# Patient Record
Sex: MALE | Race: BLACK OR AFRICAN AMERICAN | NOT HISPANIC OR LATINO | Employment: UNEMPLOYED | URBAN - METROPOLITAN AREA
[De-identification: names, ages, dates, MRNs, and addresses within clinical notes are randomized per-mention and may not be internally consistent; named-entity substitution may affect disease eponyms.]

---
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
## 2023-10-16 ENCOUNTER — APPOINTMENT (OUTPATIENT)
Dept: RADIOLOGY | Facility: MEDICAL CENTER | Age: 23
End: 2023-10-16
Payer: OTHER GOVERNMENT

## 2023-10-16 ENCOUNTER — OFFICE VISIT (OUTPATIENT)
Dept: OBGYN CLINIC | Facility: MEDICAL CENTER | Age: 23
End: 2023-10-16
Payer: OTHER GOVERNMENT

## 2023-10-16 VITALS — WEIGHT: 120 LBS | SYSTOLIC BLOOD PRESSURE: 125 MMHG | HEART RATE: 53 BPM | DIASTOLIC BLOOD PRESSURE: 73 MMHG

## 2023-10-16 DIAGNOSIS — S52.572A OTHER CLOSED INTRA-ARTICULAR FRACTURE OF DISTAL END OF LEFT RADIUS, INITIAL ENCOUNTER: Primary | ICD-10-CM

## 2023-10-16 DIAGNOSIS — M25.532 LEFT WRIST PAIN: ICD-10-CM

## 2023-10-16 PROCEDURE — 73110 X-RAY EXAM OF WRIST: CPT

## 2023-10-16 PROCEDURE — 25600 CLTX DST RDL FX/EPHYS SEP WO: CPT | Performed by: ORTHOPAEDIC SURGERY

## 2023-10-16 PROCEDURE — 99203 OFFICE O/P NEW LOW 30 MIN: CPT | Performed by: ORTHOPAEDIC SURGERY

## 2023-10-16 NOTE — PROGRESS NOTES
The HAND & UPPER EXTREMITY OFFICE VISIT   Referred By:  Referral Self  No address on file      Chief Complaint:     Left hand pain, DOI 10/6/23    History of Present Illness:   24 y.o., right hand dominant male presents for initial evaluation of left hand pain following a basketball injury on 10/6/2023. Patient was transported to from [facility] prison and is accompanied by 2 officers. Patient reports he was playing basketball when his legs gave out and he fell onto an outstretched left hand. Patient has been a wrist wrap as well as an Ace bandage. He was given Advil as needed for pain. Patient notes pain at the base of the left thumb as well as swelling of the left hand. ADLs: Community ambulator      Past Medical History:  No past medical history on file. No past surgical history on file. No family history on file. Social History     Socioeconomic History    Marital status: Single     Spouse name: Not on file    Number of children: Not on file    Years of education: Not on file    Highest education level: Not on file   Occupational History    Not on file   Tobacco Use    Smoking status: Never    Smokeless tobacco: Never   Substance and Sexual Activity    Alcohol use: No    Drug use: No    Sexual activity: Not on file   Other Topics Concern    Not on file   Social History Narrative    Not on file     Social Determinants of Health     Financial Resource Strain: Not on file   Food Insecurity: Not on file   Transportation Needs: Not on file   Physical Activity: Not on file   Stress: Not on file   Social Connections: Not on file   Intimate Partner Violence: Not on file   Housing Stability: Not on file     Scheduled Meds:  Continuous Infusions:No current facility-administered medications for this visit. PRN Meds:.   No Known Allergies        Physical Examination:    /73   Pulse (!) 53   Wt 54.4 kg (120 lb)     Gen: A&Ox3, NAD  Cardiac: regular rate  Chest: non labored breathing  Abdomen: Non-distended      Right Upper Extremity:  Skin CDI  No obvious deformity of the shoulder, arm, elbow, forearm, wrist, hand  Non tender  Able to make a composite fist  Sensation intact to light touch in the axillary median, ulnar, and radial nerve distributions  5/5 motor   2+RP      Left Upper Extremity:  Skin CDI  No obvious deformity of the shoulder, arm, elbow, forearm, wrist, hand  Tender over the distal radius metaphysis  Unable to make a composite fist  Sensation intact to light touch in the axillary median, ulnar, and radial nerve distributions  Strength testing deferred   2+RP      Studies:  Radiographs: I personally reviewed and independently interpreted the available radiographs. 10/16/23: Radiographs of the left wrist, multiple views, demonstrate a minimally displaced intra-articular distal radius fracture with less than 1 mm of joint step-off. No other fractures noted. No dislocations. Soft tissues unremarkable. Assessment and Plan:  1. Other closed intra-articular fracture of distal end of left radius, initial encounter        2. Left wrist pain  XR wrist 3+ vw left          24 y.o. male presents with signs and symptoms consistent with the above diagnosis. We discussed the natural history of this condition and its pathogenesis. We discussed operative and nonoperative treatment options. Given the good alignment of the fracture and the fact that its been stable and a soft wrap for the past week and a half I think that he will do well with nonoperative management. Patient agrees with this plan. He was placed into a short arm cast today for close management of his left distal radius fracture. Please see procedure note for further details. He should remain nonweightbearing. He should move his fingers in order to regain a composite fist.  He should keep his cast clean and dry.   He will follow-up in 4 weeks for repeat evaluation and x-rays out of the cast.    he expressed understanding of the plan and agreed. We encouraged them to contact our office with any questions or concerns.          Mary Lou Jeffery MD  Hand and Upper Extremity Surgery

## 2023-10-16 NOTE — PROGRESS NOTES
Fracture / Dislocation Treatment    Date/Time: 10/16/2023 11:30 AM    Performed by: Isamar Zaidi MD  Authorized by: Isamar Zaidi MD    Patient Location:  [facility] Protocol:  Consent: Verbal consent obtained.   Risks and benefits: risks, benefits and alternatives were discussed  Consent given by: patient  Patient identity confirmed: verbally with patient    Injury location:  Wrist  Location details:  Left wrist  Injury type:  Fracture  Fracture type: distal radius    Fracture type: distal radius    Neurovascular status: Neurovascularly intact    Local anesthesia used?: No    Manipulation performed?: No    Immobilization:  Cast  Cast type:  Short arm  Supplies used:  Cotton padding and fiberglass  Neurovascular status: Neurovascularly intact    Patient tolerance:  Patient tolerated the procedure well with no immediate complications

## 2023-11-13 ENCOUNTER — APPOINTMENT (OUTPATIENT)
Dept: RADIOLOGY | Facility: MEDICAL CENTER | Age: 23
End: 2023-11-13
Payer: OTHER GOVERNMENT

## 2023-11-13 ENCOUNTER — OFFICE VISIT (OUTPATIENT)
Dept: OBGYN CLINIC | Facility: MEDICAL CENTER | Age: 23
End: 2023-11-13

## 2023-11-13 VITALS — HEART RATE: 51 BPM | WEIGHT: 150.6 LBS | SYSTOLIC BLOOD PRESSURE: 108 MMHG | DIASTOLIC BLOOD PRESSURE: 73 MMHG

## 2023-11-13 DIAGNOSIS — S52.572D OTHER CLOSED INTRA-ARTICULAR FRACTURE OF DISTAL END OF LEFT RADIUS WITH ROUTINE HEALING, SUBSEQUENT ENCOUNTER: Primary | ICD-10-CM

## 2023-11-13 PROCEDURE — 99024 POSTOP FOLLOW-UP VISIT: CPT | Performed by: ORTHOPAEDIC SURGERY

## 2023-11-13 PROCEDURE — 73110 X-RAY EXAM OF WRIST: CPT

## 2023-11-13 NOTE — PROGRESS NOTES
HAND & UPPER EXTREMITY OFFICE VISIT   Referred By:  No referring provider defined for this encounter. Chief Complaint:     Left wrist pain  Distal radius fx, 10/6/2023    Previous History:   50-year-old right-hand-dominant male with a fall on 10/6/2023 resulting in left distal radius fracture. At his office visit on 10/16/2023 he was placed into a short arm cast.    Interval History:  50-year-old male returns today with [facility] senior care escorts for repeat evaluation of his left distal radius fracture. At his visit last month he was placed into a short arm cast. Denies any new complaints since the cast was applied. Past Medical History:  History reviewed. No pertinent past medical history. History reviewed. No pertinent surgical history. History reviewed. No pertinent family history. Social History     Socioeconomic History    Marital status: Single     Spouse name: Not on file    Number of children: Not on file    Years of education: Not on file    Highest education level: Not on file   Occupational History    Not on file   Tobacco Use    Smoking status: Never    Smokeless tobacco: Never   Substance and Sexual Activity    Alcohol use: No    Drug use: No    Sexual activity: Not on file   Other Topics Concern    Not on file   Social History Narrative    Not on file     Social Determinants of Health     Financial Resource Strain: Not on file   Food Insecurity: Not on file   Transportation Needs: Not on file   Physical Activity: Not on file   Stress: Not on file   Social Connections: Not on file   Intimate Partner Violence: Not on file   Housing Stability: Not on file     Scheduled Meds:  Continuous Infusions:No current facility-administered medications for this visit. PRN Meds:.   No Known Allergies    Physical Examination:    /73   Pulse (!) 51   Wt 68.3 kg (150 lb 9.6 oz)     Gen: A&Ox3, NAD  Cardiac: regular rate  Chest: non labored breathing  Abdomen: Non-distended      Left Upper Extremity:  Short arm cast removed  Skin CDI  No obvious deformity of the shoulder, arm, elbow, forearm  Minimal swelling  Mild tenderness over distal radius fracture, non-tender at the wrist joint itself  30 degrees of extension  20 degrees of flexion  Loose composite fist  Sensation intact to light touch in the axillary median, ulnar, and radial nerve distributions  Strength not tested  2+RP    Studies:  Radiographs: I personally reviewed and independently interpreted the available radiographs. 11/13/2023: Radiographs of the left wrist, multiple views, demonstrate: .interval healing minimally displaced intraarticular distal radius  fracture, fracture lines still visible, no further change of displacement       Assessment and Plan:  1. Other closed intra-articular fracture of distal end of left radius with routine healing, subsequent encounter  XR wrist 3+ vw left          24 y.o. male presents in follow up of his left distal radius fracture, 10/6/2023. Cast removed today, x-rays taken and reviewed, physical exam performed. Healing distal radius fracture. Recommend some wrist wrap for support for 2 more weeks. Avoid heavy lifting for a few more weeks. Avoid heavy loading with left wrist.     he expressed understanding of the plan and agreed. We encouraged them to contact our office with any questions or concerns. Return for re-check left wrist ROM in 4-6 weeks, no XRs needed. Radha Pemberton MD  Hand and Upper Extremity Surgery    Scribe Attestation      I,:  Tu Alcaraz am acting as a scribe while in the presence of the attending physician.:       I,:  Darion Vick MD personally performed the services described in this documentation    as scribed in my presence.:               *This note was dictated using Dragon voice recognition software. Please excuse any word substitutions or errors. *

## 2024-01-03 ENCOUNTER — OFFICE VISIT (OUTPATIENT)
Dept: OBGYN CLINIC | Facility: MEDICAL CENTER | Age: 24
End: 2024-01-03

## 2024-01-03 VITALS — DIASTOLIC BLOOD PRESSURE: 72 MMHG | HEART RATE: 55 BPM | SYSTOLIC BLOOD PRESSURE: 103 MMHG

## 2024-01-03 DIAGNOSIS — S52.572D OTHER CLOSED INTRA-ARTICULAR FRACTURE OF DISTAL END OF LEFT RADIUS WITH ROUTINE HEALING, SUBSEQUENT ENCOUNTER: Primary | ICD-10-CM

## 2024-01-03 PROCEDURE — 99024 POSTOP FOLLOW-UP VISIT: CPT | Performed by: ORTHOPAEDIC SURGERY

## 2024-01-03 NOTE — PROGRESS NOTES
HAND & UPPER EXTREMITY OFFICE VISIT   Referred By:  No referring provider defined for this encounter.      Chief Complaint:     Left wrist pain  Distal radius fx, 10/6/2023    Previous History:   23-year-old male returns today with [facility] escorts for repeat evaluation of his left distal radius fracture.  At his last visit his cast was removed and he was provided wrist wraps for support. He had no new complaints      Interval History:  23-year-old male returns today with [facility] escorts for repeat evaluation of his left distal radius fracture.  He is now 3 months out from injury.  Since last visit he put a new splint back on which was the original 1 that given even though he was recommended discontinue his cast last visit.  He felt he needed some more support.  He has not been working on wrist range of motion.  He has been working on finger motion.  Denies numbness or tingling.  Pain has been improving.    Past Medical History:  History reviewed. No pertinent past medical history.  History reviewed. No pertinent surgical history.  History reviewed. No pertinent family history.  Social History     Socioeconomic History    Marital status: Single     Spouse name: Not on file    Number of children: Not on file    Years of education: Not on file    Highest education level: Not on file   Occupational History    Not on file   Tobacco Use    Smoking status: Never    Smokeless tobacco: Never   Substance and Sexual Activity    Alcohol use: No    Drug use: No    Sexual activity: Not on file   Other Topics Concern    Not on file   Social History Narrative    Not on file     Social Determinants of Health     Financial Resource Strain: Not on file   Food Insecurity: Not on file   Transportation Needs: Not on file   Physical Activity: Not on file   Stress: Not on file   Social Connections: Not on file   Intimate Partner Violence: Not on file   Housing Stability: Not on file     Scheduled  Meds:  Continuous Infusions:No current facility-administered medications for this visit.    PRN Meds:.  No Known Allergies    Physical Examination:    /72   Pulse 55     Gen: A&Ox3, NAD  Cardiac: regular rate  Chest: non labored breathing  Abdomen: Non-distended          Left Upper Extremity:  Skin CDI  No obvious deformity of the shoulder, arm, elbow, forearm, wrist, hand  No significant swelling.  Nontender over the distal radius but he is mildly tender over the dorsal radiocarpal  Sensation intact to light touch in the axillary median, ulnar, and radial nerve distributions  Significant limited wrist range of motion by 10 degrees extension to 1 degrees flexion.  Decreased supination to 45 degrees and pronation to 60 degrees.    4/5  strength    2+RP    Studies:  No new imaging obtained today  Assessment and Plan:  1. Other closed intra-articular fracture of distal end of left radius with routine healing, subsequent encounter            23 y.o. male presents in follow up for the above diagnosis.  He is 3 months out from his nondisplaced distal radius fracture.  Removed his splint that he had on today and discontinued it.  He should work on range of motion of the wrist is much as possible to improve upon his stiffness.  Think the residual pain he is having is actually due to stiffness of the joint as opposed to the pain at the fracture.  His fracture is nontender today and he has clinically healed.  He is radiographically healed at last visit as well.    Recommended that he can use ibuprofen 3 times a day as needed for relief of his pain and swelling.  Follow-up in 6 to 8 weeks for range of motion check.  Proper paperwork filled out.    he expressed understanding of the plan and agreed. We encouraged them to contact our office with any questions or concerns.       Del Gonzales MD  Hand and Upper Extremity Surgery      *This note was dictated using Dragon voice recognition software. Please excuse any  word substitutions or errors.*

## 2024-02-02 ENCOUNTER — OFFICE VISIT (OUTPATIENT)
Dept: OBGYN CLINIC | Facility: CLINIC | Age: 24
End: 2024-02-02
Payer: OTHER GOVERNMENT

## 2024-02-02 VITALS — BODY MASS INDEX: 24.11 KG/M2 | WEIGHT: 150 LBS | HEIGHT: 66 IN

## 2024-02-02 DIAGNOSIS — S52.572D OTHER CLOSED INTRA-ARTICULAR FRACTURE OF DISTAL END OF LEFT RADIUS WITH ROUTINE HEALING, SUBSEQUENT ENCOUNTER: Primary | ICD-10-CM

## 2024-02-02 PROCEDURE — 99213 OFFICE O/P EST LOW 20 MIN: CPT | Performed by: ORTHOPAEDIC SURGERY

## 2024-02-02 NOTE — PROGRESS NOTES
"    HAND & UPPER EXTREMITY OFFICE VISIT   Referred By:  No referring provider defined for this encounter.      Chief Complaint:     Left wrist pain  Distal radius fx, 10/6/2023    Previous History:   Patient was last seen 1/3/2024 and was advised to work on ROM of his wrist as he is stiff.     Interval History:  Patient states today that he continues to have stiffness as well as pain with AROM of his wrist. He also reports occasional swelling of his hand as well    Past Medical History:  Past Medical History:   Diagnosis Date    Head injury      History reviewed. No pertinent surgical history.  Family History   Problem Relation Age of Onset    Vascular Disease Mother     Diabetes Mother     Diabetes Paternal Grandfather      Social History     Socioeconomic History    Marital status: Single     Spouse name: Not on file    Number of children: Not on file    Years of education: Not on file    Highest education level: Not on file   Occupational History    Not on file   Tobacco Use    Smoking status: Never    Smokeless tobacco: Never   Vaping Use    Vaping status: Never Used   Substance and Sexual Activity    Alcohol use: No    Drug use: No    Sexual activity: Not on file   Other Topics Concern    Not on file   Social History Narrative    Not on file     Social Determinants of Health     Financial Resource Strain: Not on file   Food Insecurity: Not on file   Transportation Needs: Not on file   Physical Activity: Not on file   Stress: Not on file   Social Connections: Not on file   Intimate Partner Violence: Not on file   Housing Stability: Not on file     Scheduled Meds:  Continuous Infusions:No current facility-administered medications for this visit.    PRN Meds:.  No Known Allergies    Physical Examination:    Ht 5' 6\" (1.676 m)   Wt 68 kg (150 lb)   BMI 24.21 kg/m²     Gen: A&Ox3, NAD  Cardiac: regular rate  Chest: non labored breathing  Abdomen: Non-distended        Left Upper Extremity:  Skin CDI  No obvious " deformity of the shoulder, arm, elbow, forearm, wrist, hand  Sensation intact to light touch in the axillary median, ulnar, and radial nerve distributions  2+RP  Composite fist  Limited flexion of wrist 2nd to sriffness    Studies:  No new images obtained      Assessment and Plan:  1. Other closed intra-articular fracture of distal end of left radius with routine healing, subsequent encounter            23 y.o. male presents in follow up for the above diagnosis.     He was advised to keep stretching his wrist and fingers. Motion does take time to improve as he remains very stiff. He may be more aggressive with his motion.   If he does not show improvement  we can consider CSI to calm down his inflammation. He may continue to take NSAID's as needed for pain    F/U 2 months for ROM check    he expressed understanding of the plan and agreed. We encouraged them to contact our office with any questions or concerns.       Del Gonzales MD  Hand and Upper Extremity Surgery      *This note was dictated using Dragon voice recognition software. Please excuse any word substitutions or errors.*

## 2024-04-05 ENCOUNTER — OFFICE VISIT (OUTPATIENT)
Dept: OBGYN CLINIC | Facility: CLINIC | Age: 24
End: 2024-04-05
Payer: OTHER GOVERNMENT

## 2024-04-05 VITALS — SYSTOLIC BLOOD PRESSURE: 126 MMHG | DIASTOLIC BLOOD PRESSURE: 73 MMHG

## 2024-04-05 DIAGNOSIS — S52.572D OTHER CLOSED INTRA-ARTICULAR FRACTURE OF DISTAL END OF LEFT RADIUS WITH ROUTINE HEALING, SUBSEQUENT ENCOUNTER: ICD-10-CM

## 2024-04-05 DIAGNOSIS — M65.4 DE QUERVAIN'S DISEASE (RADIAL STYLOID TENOSYNOVITIS): Primary | ICD-10-CM

## 2024-04-05 PROCEDURE — 20550 NJX 1 TENDON SHEATH/LIGAMENT: CPT | Performed by: ORTHOPAEDIC SURGERY

## 2024-04-05 PROCEDURE — 99213 OFFICE O/P EST LOW 20 MIN: CPT | Performed by: ORTHOPAEDIC SURGERY

## 2024-04-05 RX ORDER — BETAMETHASONE SODIUM PHOSPHATE AND BETAMETHASONE ACETATE 3; 3 MG/ML; MG/ML
6 INJECTION, SUSPENSION INTRA-ARTICULAR; INTRALESIONAL; INTRAMUSCULAR; SOFT TISSUE
Status: COMPLETED | OUTPATIENT
Start: 2024-04-05 | End: 2024-04-05

## 2024-04-05 RX ORDER — LIDOCAINE HYDROCHLORIDE 10 MG/ML
1 INJECTION, SOLUTION INFILTRATION; PERINEURAL
Status: COMPLETED | OUTPATIENT
Start: 2024-04-05 | End: 2024-04-05

## 2024-04-05 RX ADMIN — LIDOCAINE HYDROCHLORIDE 1 ML: 10 INJECTION, SOLUTION INFILTRATION; PERINEURAL at 14:15

## 2024-04-05 RX ADMIN — BETAMETHASONE SODIUM PHOSPHATE AND BETAMETHASONE ACETATE 6 MG: 3; 3 INJECTION, SUSPENSION INTRA-ARTICULAR; INTRALESIONAL; INTRAMUSCULAR; SOFT TISSUE at 14:15

## 2024-04-05 NOTE — PROGRESS NOTES
HAND & UPPER EXTREMITY OFFICE VISIT   Referred By:  No referring provider defined for this encounter.      Chief Complaint:     Left wrist pain    Previous History:   Previously managed nonoperatively with a minimally displaced distal radius fracture in a cast.  He had been doing well and working on wrist range of motion.  He has been weightbearing as tolerated    Interval History:  He states that overall he is doing well but he is having some pain on the radial aspect of his wrist when he moves his thumb around.  He states that this flares up with repetitive activities.  Denies numbness or tingling.  States that this feels different than when he broke his wrist.    Past Medical History:  Past Medical History:   Diagnosis Date    Head injury      No past surgical history on file.  Family History   Problem Relation Age of Onset    Vascular Disease Mother     Diabetes Mother     Diabetes Paternal Grandfather      Social History     Socioeconomic History    Marital status: Single     Spouse name: Not on file    Number of children: Not on file    Years of education: Not on file    Highest education level: Not on file   Occupational History    Not on file   Tobacco Use    Smoking status: Never    Smokeless tobacco: Never   Vaping Use    Vaping status: Never Used   Substance and Sexual Activity    Alcohol use: No    Drug use: No    Sexual activity: Not on file   Other Topics Concern    Not on file   Social History Narrative    Not on file     Social Determinants of Health     Financial Resource Strain: Not on file   Food Insecurity: Not on file   Transportation Needs: Not on file   Physical Activity: Not on file   Stress: Not on file   Social Connections: Not on file   Intimate Partner Violence: Not on file   Housing Stability: Not on file     Scheduled Meds:  Continuous Infusions:No current facility-administered medications for this visit.    PRN Meds:.  No Known Allergies    Physical Examination:    /73      Gen: A&Ox3, NAD  Cardiac: regular rate  Chest: non labored breathing  Abdomen: Non-distended        Left Upper Extremity:  Skin CDI  No obvious deformity of the shoulder, arm, elbow, forearm, wrist, hand  Tender over the first dorsal compartment  Positive Finkelstein's  Nontender over the thumb CMC joint, nontender over the distal radius  Sensation intact to light touch in the axillary median, ulnar, and radial nerve distributions  5/5 motor  strength  2+RP    Studies:  No new imaging    Assessment and Plan:  1. De Quervain's disease (radial styloid tenosynovitis)        2. Other closed intra-articular fracture of distal end of left radius with routine healing, subsequent encounter            23 y.o. male presents in follow up for the above diagnosis.  This radius fracture is healing well but he appears to have developed de Quervain's tendinitis.  Discussed treatment options including continued exercises bracing or steroid injections.  Bracing is difficult for him in the present as it does change where he has to live.  He would prefer to avoid bracing.  He would like to try de Quervain's injection today. Risks, benefits and alternative treatments were discussed with the patient. The risks of injection include but are not limited to: bleeding, infection, damage to nerves, vessels or tendons, allergic reaction to agents, possible increase in pain, tendon or ligament rupture, weakening of bone or soft tissues, and/or elevation in blood sugar. Patient understands and would like to proceed with the proposed procedure. Injection was tolerated well. Please see procedure note for details. May take NSAIDs/Tylenol or apply ice to the area as needed for post-injection discomfort.       He will follow-up in 6 to 8 weeks for repeat evaluation.        he expressed understanding of the plan and agreed. We encouraged them to contact our office with any questions or concerns.       Del Gonzales MD  Hand and Upper  Extremity Surgery      *This note was dictated using Dragon voice recognition software. Please excuse any word substitutions or errors.*

## 2024-04-05 NOTE — PROGRESS NOTES
"Hand/upper extremity injection: L extensor compartment 1  Universal Protocol:  Consent: Verbal consent obtained.  Risks and benefits: risks, benefits and alternatives were discussed  Consent given by: patient  Patient identity confirmed: verbally with patient  Supporting Documentation  Indications: pain   Procedure Details  Condition:de Quervain's tenosynovitis Site: L extensor compartment 1   Preparation: Patient was prepped and draped in the usual sterile fashion  Needle size: 25 G  Ultrasound guidance: no  Approach: radial  Medications administered: 1 mL lidocaine 1 %; 6 mg betamethasone acetate-betamethasone sodium phosphate 6 (3-3) mg/mL  Patient tolerance: patient tolerated the procedure well with no immediate complications  Dressing:  Sterile dressing applied    Procedure: Corticosteroid Injection  First Dorsal Compartment     The nature of and the indications for a corticosteroid and/or local anaesthetic injection were reviewed in detail with the patient today.  The inherent risks of injection including infection, allergic reaction, increased pain, incomplete relief or temporary relief of symptoms, alterations of blood glucose levels requiring careful monitoring and treatment as indicated, tendon, ligament or articular cartilage rupture or degeneration, were discussed.  The patient elected to proceed with the injection.  The injection was performed under sterile conditions after informed consent was obtained from the patient and the correct injection site was confirmed with the patient.      Left upper extremity was injected at the first dorsal compartment of the wrist with 2 cc of a 1:1 solution of \"Celestone (6mg/cc) and 1% Xylocaine without epinepherine. A sterile band-aide was applied.  The patient tolerated the injection well and was discharged without complication.  Post-injection protocol for activity modification, anti-inflammatory measures, and follow-up was reviewed with the patient.         "